# Patient Record
Sex: FEMALE | Race: WHITE | NOT HISPANIC OR LATINO | Employment: OTHER | URBAN - METROPOLITAN AREA
[De-identification: names, ages, dates, MRNs, and addresses within clinical notes are randomized per-mention and may not be internally consistent; named-entity substitution may affect disease eponyms.]

---

## 2017-06-07 ENCOUNTER — OFFICE VISIT (OUTPATIENT)
Dept: OTOLARYNGOLOGY | Facility: CLINIC | Age: 67
End: 2017-06-07
Payer: COMMERCIAL

## 2017-06-07 VITALS
HEART RATE: 71 BPM | TEMPERATURE: 98.2 F | SYSTOLIC BLOOD PRESSURE: 134 MMHG | BODY MASS INDEX: 28.08 KG/M2 | DIASTOLIC BLOOD PRESSURE: 81 MMHG | WEIGHT: 182 LBS

## 2017-06-07 DIAGNOSIS — H61.23 BILATERAL IMPACTED CERUMEN: Primary | ICD-10-CM

## 2017-06-07 PROCEDURE — 69210 REMOVE IMPACTED EAR WAX UNI: CPT | Performed by: OTOLARYNGOLOGY

## 2017-06-07 PROCEDURE — 99207 ZZC NO CHARGE LOS: CPT | Performed by: OTOLARYNGOLOGY

## 2017-06-07 ASSESSMENT — PAIN SCALES - GENERAL: PAINLEVEL: NO PAIN (0)

## 2017-06-07 NOTE — NURSING NOTE
"Initial /81  Pulse 71  Temp 98.2  F (36.8  C) (Oral)  Wt 82.6 kg (182 lb)  BMI 28.08 kg/m2 Estimated body mass index is 28.08 kg/(m^2) as calculated from the following:    Height as of 2/4/15: 1.715 m (5' 7.5\").    Weight as of this encounter: 82.6 kg (182 lb). .    Isela Rodas LPN    "

## 2017-06-07 NOTE — MR AVS SNAPSHOT
"              After Visit Summary   2017    Reba Arevalo    MRN: [id number]           Patient Information     Date Of Birth          1950        Visit Information        Provider Department      2017 12:00 PM Nicole Capone MD [facility]        Today's Diagnoses     Bilateral impacted cerumen    -  1      Care Instructions    Per physician's instructions            Follow-ups after your visit        Who to contact     If you have questions or need follow up information about today's clinic visit or your schedule please contact [facility] directly at [phone].  Normal or non-critical lab and imaging results will be communicated to you by SA Ignitehart, letter or phone within 4 business days after the clinic has received the results. If you do not hear from us within 7 days, please contact the clinic through SA Ignitehart or phone. If you have a critical or abnormal lab result, we will notify you by phone as soon as possible.  Submit refill requests through [employer] or call your pharmacy and they will forward the refill request to us. Please allow 3 business days for your refill to be completed.          Additional Information About Your Visit        MyChart Information     Echovox lets you send messages to your doctor, view your test results, renew your prescriptions, schedule appointments and more. To sign up, go to [email or web address] . Click on \"Log in\" on the left side of the screen, which will take you to the Welcome page. Then click on \"Sign up Now\" on the right side of the page.     You will be asked to enter the access code listed below, as well as some personal information. Please follow the directions to create your username and password.     Your access code is: [email or web address]  Expires: 2017 12:35 PM     Your access code will  in 90 days. If you need help or a new code, please call your [facility] or [phone].        Care EveryWhere ID     This is your " Care EveryWhere ID. This could be used by other organizations to access your [facility] medical records  [id number]        Your Vitals Were     Pulse Temperature BMI (Body Mass Index)             71 98.2  F (36.8  C) (Oral) 28.08 kg/m2          Blood Pressure from Last 3 Encounters:   06/07/17 134/81   12/14/16 138/88   03/23/16 155/83    Weight from Last 3 Encounters:   06/07/17 82.6 kg (182 lb)   12/14/16 95.7 kg (211 lb)   03/23/16 90.7 kg (200 lb)              We Performed the Following     Remove Christina        Primary Care Provider    [facility] Other Clinic                Thank you!     Thank you for choosing [facility]  for your care. Our goal is always to provide you with excellent care. Hearing back from our patients is one way we can continue to improve our services. Please take a few minutes to complete the written survey that you may receive in the mail after your visit with us. Thank you!             Your Updated Medication List - Protect others around you: Learn how to safely use, store and throw away your medicines at www.disposemymeds.org.          This list is accurate as of: 6/7/17 12:35 PM.  Always use your most recent med list.                   Brand Name Dispense Instructions for use    ALEVE 220 MG capsule   Generic drug:  naproxen sodium      Take 220 mg by mouth 2 times daily (with meals).       CALCIUM + D PO      Take  by mouth 2 times daily.       CELEBREX PO      Take 50 mg by mouth daily       DAILY MULTIVITAMIN PO      Take  by mouth daily.       hydrochlorothiazide 25 MG tablet    HYDRODIURIL     Take 25 mg by mouth daily.       latanoprost 0.005 % ophthalmic solution    XALATAN     1 drop At Bedtime

## 2017-06-07 NOTE — PROGRESS NOTES
History of Present Illness - Reba Arevalo is a 66 year old female routinely seen for cerumen impactions, last seen in December 2016. She denies any new otorrhea, otalgia, or vertigo. She denies q-tip use. She usually comes in every 6 months for an ear cleaning. 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     Recently she noticed the left ear was popping and cracking when she was chewing. This improved with application of some peroxide.    Past Medical History -   No prior ear surgery.    Current Medications -   Current Outpatient Prescriptions:      Celecoxib (CELEBREX PO), Take 50 mg by mouth daily, Disp: , Rfl:      latanoprost (XALATAN) 0.005 % ophthalmic solution, 1 drop At Bedtime, Disp: , Rfl:      hydrochlorothiazide (HYDRODIURIL) 25 MG tablet, Take 25 mg by mouth daily., Disp: , Rfl:      Multiple Vitamin (DAILY MULTIVITAMIN PO), Take  by mouth daily., Disp: , Rfl:      Calcium-Vitamin D (CALCIUM + D PO), Take  by mouth 2 times daily., Disp: , Rfl:      naproxen sodium (ALEVE) 220 MG capsule, Take 220 mg by mouth 2 times daily (with meals)., Disp: , Rfl:     Allergies -   Allergies   Allergen Reactions     Penicillins        Social History -   History     Social History     Marital Status:      Spouse Name: N/A     Number of Children: N/A     Years of Education: N/A     Social History Main Topics     Smoking status: Never Smoker      Smokeless tobacco: Never Used     Alcohol Use: No     Drug Use: No     Sexual Activity: Not on file     Other Topics Concern     Not on file     Social History Narrative       Family History -   Family History   Problem Relation Age of Onset     HEART DISEASE Mother      DIABETES Father      HEART DISEASE Father        Review of Systems - As per HPI and PMHx, otherwise 7 system review of the head and neck negative.    Exam:  /81  Pulse 71  Temp 98.2  F (36.8  C) (Oral)  Wt 82.6 kg (182 lb)  BMI 28.08 kg/m2  General - The patient is well nourished and well  developed, and appears to have good nutritional status.  Alert and oriented to person and place, answers questions and cooperates with examination appropriately.   Head and Face - Normocephalic and atraumatic, with no gross asymmetry noted of the contour of the facial features.  The facial nerve is intact, with strong symmetric movements.  Eyes - Extraocular movements intact.  Sclera were not icteric or injected, conjunctiva were pink and moist.    Procedure: Cerumen Disimpaction  Diagnosis: Cerumen Impaction    Procedure and Findings  Ears - On examination of the ears, I found that the  ears were impacted with dense cerumen obscuring visualization of the right and left TM.  Therefore, I positioned the patient and I used the binocular surgical microscope to assist in visualization of the affected ear(s).  I began by using a cerumen loop to gently lift the edges of the cerumen mass away from the walls of the external canal.  Once I did this, I was able to suction away fragments of wax and debris using suction.  Once the mass was loose enough, the entire plug was pulled from the canal with microforceps.  The tympanic membrane was intact without sign of perforation or middle ear effusion and minimal ear canal trauma.         A/P - Reba Arevalo is a 66 year old female with bilateral cerumen impactions, worse on the left. These have been cleared today, and the eardrums seem to be intact and healthy. I recommended she schedule another appointment in 5-6 months, and encouraged her to avoid placing peroxide in the ears.      Dr. Nicole Capone MD  Otolaryngology  [facility]

## 2017-11-13 ENCOUNTER — OFFICE VISIT (OUTPATIENT)
Dept: OTOLARYNGOLOGY | Facility: CLINIC | Age: 67
End: 2017-11-13
Payer: COMMERCIAL

## 2017-11-13 VITALS
TEMPERATURE: 97.5 F | HEIGHT: 68 IN | BODY MASS INDEX: 26.22 KG/M2 | SYSTOLIC BLOOD PRESSURE: 129 MMHG | HEART RATE: 65 BPM | WEIGHT: 173 LBS | DIASTOLIC BLOOD PRESSURE: 77 MMHG

## 2017-11-13 DIAGNOSIS — H61.23 BILATERAL IMPACTED CERUMEN: Primary | ICD-10-CM

## 2017-11-13 PROCEDURE — 69210 REMOVE IMPACTED EAR WAX UNI: CPT | Mod: 50 | Performed by: OTOLARYNGOLOGY

## 2017-11-13 PROCEDURE — 99212 OFFICE O/P EST SF 10 MIN: CPT | Mod: 25 | Performed by: OTOLARYNGOLOGY

## 2017-11-13 ASSESSMENT — PAIN SCALES - GENERAL: PAINLEVEL: NO PAIN (0)

## 2017-11-13 NOTE — MR AVS SNAPSHOT
"              After Visit Summary   2017    Reba Arevalo    MRN: [id number]           Patient Information     Date Of Birth          1950        Visit Information        Provider Department      2017 12:15 PM Nicole Capone MD [facility]        Today's Diagnoses     Bilateral impacted cerumen    -  1      Care Instructions    Per Physician's instructions            Follow-ups after your visit        Who to contact     If you have questions or need follow up information about today's clinic visit or your schedule please contact [facility] directly at [phone].  Normal or non-critical lab and imaging results will be communicated to you by [email or web address], letter or phone within 4 business days after the clinic has received the results. If you do not hear from us within 7 days, please contact the clinic through [email or web address] or phone. If you have a critical or abnormal lab result, we will notify you by phone as soon as possible.  Submit refill requests through [employer] or call your pharmacy and they will forward the refill request to us. Please allow 3 business days for your refill to be completed.          Additional Information About Your Visit        MyChart Information     [employer] lets you send messages to your doctor, view your test results, renew your prescriptions, schedule appointments and more. To sign up, go to [email or web address] . Click on \"Log in\" on the left side of the screen, which will take you to the Welcome page. Then click on \"Sign up Now\" on the right side of the page.     You will be asked to enter the access code listed below, as well as some personal information. Please follow the directions to create your username and password.     Your access code is: [email or web address]  Expires: 2018  2:41 PM     Your access code will  in 90 days. If you need help or a new code, please call your [facility] or [phone].        Care EveryWhere ID     This is " "your Care EveryWhere ID. This could be used by other organizations to access your [facility] medical records  [id number]        Your Vitals Were     Pulse Temperature Height BMI (Body Mass Index)          65 97.5  F (36.4  C) (Oral) 1.721 m (5' 7.75\") 26.5 kg/m2         Blood Pressure from Last 3 Encounters:   11/13/17 129/77   06/07/17 134/81   12/14/16 138/88    Weight from Last 3 Encounters:   11/13/17 78.5 kg (173 lb)   06/07/17 82.6 kg (182 lb)   12/14/16 95.7 kg (211 lb)              We Performed the Following     Remove [facility]        Primary Care Provider Office Phone # Fax #    Eri HENSON Owusu Gela [phone]       [facility] [address] [address]        Equal Access to Services     [facility]: Hadii libra hernandez hadasho Soomaali, waaxda luqadaha, qaybta kaalmada adeegyada, karl gregory . So [facility] [phone].    ATENCIÓN: Si habla español, tiene a barajas disposición servicios gratuitos de asistencia lingüística. Martínez al [phone].    We comply with applicable federal civil rights laws and [place] laws. We do not discriminate on the basis of race, color, national origin, age, disability, sex, sexual orientation, or gender identity.            Thank you!     Thank you for choosing [facility]  for your care. Our goal is always to provide you with excellent care. Hearing back from our patients is one way we can continue to improve our services. Please take a few minutes to complete the written survey that you may receive in the mail after your visit with us. Thank you!             Your Updated Medication List - Protect others around you: Learn how to safely use, store and throw away your medicines at www.disposemymeds.org.          This list is accurate as of: 11/13/17  2:41 PM.  Always use your most recent med list.                   Brand Name Dispense Instructions for use Diagnosis    ALEVE 220 MG capsule   Generic drug:  " naproxen sodium      Take 220 mg by mouth 2 times daily (with meals).        CALCIUM + D PO      Take  by mouth 2 times daily.        CELEBREX PO      Take 50 mg by mouth daily        DAILY MULTIVITAMIN PO      Take  by mouth daily.        hydrochlorothiazide 25 MG tablet    HYDRODIURIL     Take 25 mg by mouth daily.        latanoprost 0.005 % ophthalmic solution    XALATAN     1 drop At Bedtime

## 2017-11-13 NOTE — NURSING NOTE
"Initial /77 (BP Location: Right arm, Patient Position: Sitting, Cuff Size: Adult Large)  Pulse 65  Temp 97.5  F (36.4  C) (Oral)  Ht 1.721 m (5' 7.75\")  Wt 78.5 kg (173 lb)  BMI 26.5 kg/m2 Estimated body mass index is 26.5 kg/(m^2) as calculated from the following:    Height as of this encounter: 1.721 m (5' 7.75\").    Weight as of this encounter: 78.5 kg (173 lb). .    Denise Aguirre CMA    "

## 2017-11-13 NOTE — PROGRESS NOTES
"History of Present Illness - Reba Arevalo is a 67 year old female routinely seen for cerumen impactions, last seen in December 2016. She denies any new otorrhea, otalgia, or vertigo. She denies q-tip use. Patient has never worn hearing aids. She usually comes in every 6 months for an ear cleaning. 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         Past Medical History -   No prior ear surgery.    Current Medications -   Current Outpatient Prescriptions:      Celecoxib (CELEBREX PO), Take 50 mg by mouth daily, Disp: , Rfl:      latanoprost (XALATAN) 0.005 % ophthalmic solution, 1 drop At Bedtime, Disp: , Rfl:      hydrochlorothiazide (HYDRODIURIL) 25 MG tablet, Take 25 mg by mouth daily., Disp: , Rfl:      naproxen sodium (ALEVE) 220 MG capsule, Take 220 mg by mouth 2 times daily (with meals)., Disp: , Rfl:      Multiple Vitamin (DAILY MULTIVITAMIN PO), Take  by mouth daily., Disp: , Rfl:      Calcium-Vitamin D (CALCIUM + D PO), Take  by mouth 2 times daily., Disp: , Rfl:     Allergies -   Allergies   Allergen Reactions     Penicillins        Social History -   History     Social History     Marital Status:      Spouse Name: N/A     Number of Children: N/A     Years of Education: N/A     Social History Main Topics     Smoking status: Never Smoker      Smokeless tobacco: Never Used     Alcohol Use: No     Drug Use: No     Sexual Activity: Not on file     Other Topics Concern     Not on file     Social History Narrative       Family History -   Family History   Problem Relation Age of Onset     HEART DISEASE Mother      DIABETES Father      HEART DISEASE Father        Review of Systems - As per HPI and PMHx, otherwise 7 system review of the head and neck negative.    Exam:  /77 (BP Location: Right arm, Patient Position: Sitting, Cuff Size: Adult Large)  Pulse 65  Temp 97.5  F (36.4  C) (Oral)  Ht 1.721 m (5' 7.75\")  Wt 78.5 kg (173 lb)  BMI 26.5 kg/m2  General - The patient is well nourished and well " developed, and appears to have good nutritional status.  Alert and oriented to person and place, answers questions and cooperates with examination appropriately.   Head and Face - Normocephalic and atraumatic, with no gross asymmetry noted of the contour of the facial features.  The facial nerve is intact, with strong symmetric movements.  Eyes - Extraocular movements intact.  Sclera were not icteric or injected, conjunctiva were pink and moist.  Ears - Bilateral pinna and EACs with normal appearing overlying skin. Tympanic membrane intact with good mobility on pneumatic otoscopy bilaterally. Bony landmarks of the ossicular chain are normal. The tympanic membranes are normal in appearance. No retraction, perforation, or masses.  No fluid or purulence was seen in the external canal or the middle ear. Bilateral cerumen impaction.        Procedure: Cerumen Disimpaction  Diagnosis: Cerumen Impaction    Procedure and Findings  Ears - On examination of the ears, I found that the  ears were impacted with dense cerumen obscuring visualization of the right and left TM.  Therefore, I positioned the patient and I used the binocular surgical microscope to assist in visualization of the affected ear(s).  I began by using a cerumen loop to gently lift the edges of the cerumen mass away from the walls of the external canal.  Once I did this, I was able to suction away fragments of wax and debris using suction.  Once the mass was loose enough, the entire plug was pulled from the canal with microforceps.  The tympanic membrane was intact without sign of perforation or middle ear effusion and minimal ear canal trauma.         A/P - Reba Arevalo is a 67 year old female with bilateral cerumen impactions, worse on the left. These have been cleared today, and the eardrums seem to be intact and healthy. She has very collapsible external ear canals. I recommended she schedule another appointment in 5-6 months.      This document serves as  a record of the services and decisions personally performed and made by Dr. Nicole Capone MD. It was created on his behalf by Génesis Velazquez, a trained medical scribe. The creation of this document is based the provider's statements to the medical scribe.  Génesis Velazquez 12:01 PM 11/13/2017    Provider:   The information in this document, created by the medical scribe for me, accurately reflects the services I personally performed and the decisions made by me. I have reviewed and approved this document for accuracy prior to leaving the patient care area.  Dr. Nicole Capone MD 12:01 PM 11/13/2017    Dr. Nicole Capone MD  Otolaryngology  [facility]

## 2017-11-13 NOTE — LETTER
11/13/2017         RE: Reba Arevalo  [address] [address]        Dear Colleague,    Thank you for referring your patient, Reba Arevalo, to the [facility]. Please see a copy of my visit note below.    History of Present Illness - Reba Arevalo is a 67 year old female routinely seen for cerumen impactions, last seen in December 2016. She denies any new otorrhea, otalgia, or vertigo. She denies q-tip use. Patient has never worn hearing aids. She usually comes in every 6 months for an ear cleaning. 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         Past Medical History -   No prior ear surgery.    Current Medications -   Current Outpatient Prescriptions:      Celecoxib (CELEBREX PO), Take 50 mg by mouth daily, Disp: , Rfl:      latanoprost (XALATAN) 0.005 % ophthalmic solution, 1 drop At Bedtime, Disp: , Rfl:      hydrochlorothiazide (HYDRODIURIL) 25 MG tablet, Take 25 mg by mouth daily., Disp: , Rfl:      naproxen sodium (ALEVE) 220 MG capsule, Take 220 mg by mouth 2 times daily (with meals)., Disp: , Rfl:      Multiple Vitamin (DAILY MULTIVITAMIN PO), Take  by mouth daily., Disp: , Rfl:      Calcium-Vitamin D (CALCIUM + D PO), Take  by mouth 2 times daily., Disp: , Rfl:     Allergies -   Allergies   Allergen Reactions     Penicillins        Social History -   History     Social History     Marital Status:      Spouse Name: N/A     Number of Children: N/A     Years of Education: N/A     Social History Main Topics     Smoking status: Never Smoker      Smokeless tobacco: Never Used     Alcohol Use: No     Drug Use: No     Sexual Activity: Not on file     Other Topics Concern     Not on file     Social History Narrative       Family History -   Family History   Problem Relation Age of Onset     HEART DISEASE Mother      DIABETES Father      HEART DISEASE Father        Review of Systems - As per HPI and PMHx, otherwise 7 system review of the head and neck negative.    Exam:  /77  "(BP Location: Right arm, Patient Position: Sitting, Cuff Size: Adult Large)  Pulse 65  Temp 97.5  F (36.4  C) (Oral)  Ht 1.721 m (5' 7.75\")  Wt 78.5 kg (173 lb)  BMI 26.5 kg/m2  General - The patient is well nourished and well developed, and appears to have good nutritional status.  Alert and oriented to person and place, answers questions and cooperates with examination appropriately.   Head and Face - Normocephalic and atraumatic, with no gross asymmetry noted of the contour of the facial features.  The facial nerve is intact, with strong symmetric movements.  Eyes - Extraocular movements intact.  Sclera were not icteric or injected, conjunctiva were pink and moist.  Ears - Bilateral pinna and EACs with normal appearing overlying skin. Tympanic membrane intact with good mobility on pneumatic otoscopy bilaterally. Bony landmarks of the ossicular chain are normal. The tympanic membranes are normal in appearance. No retraction, perforation, or masses.  No fluid or purulence was seen in the external canal or the middle ear. Bilateral cerumen impaction.        Procedure: Cerumen Disimpaction  Diagnosis: Cerumen Impaction    Procedure and Findings  Ears - On examination of the ears, I found that the  ears were impacted with dense cerumen obscuring visualization of the right and left TM.  Therefore, I positioned the patient and I used the binocular surgical microscope to assist in visualization of the affected ear(s).  I began by using a cerumen loop to gently lift the edges of the cerumen mass away from the walls of the external canal.  Once I did this, I was able to suction away fragments of wax and debris using suction.  Once the mass was loose enough, the entire plug was pulled from the canal with microforceps.  The tympanic membrane was intact without sign of perforation or middle ear effusion and minimal ear canal trauma.         A/P - Reba Arevalo is a 67 year old female with bilateral cerumen impactions, " worse on the left. These have been cleared today, and the eardrums seem to be intact and healthy. She has very collapsible external ear canals. I recommended she schedule another appointment in 5-6 months.      This document serves as a record of the services and decisions personally performed and made by Dr. Nicole Capone MD. It was created on his behalf by Génesis Velazquez, a trained medical scribe. The creation of this document is based the provider's statements to the medical scribe.  Génesis Velazquez 12:01 PM 11/13/2017    Provider:   The information in this document, created by the medical scribe for me, accurately reflects the services I personally performed and the decisions made by me. I have reviewed and approved this document for accuracy prior to leaving the patient care area.  Dr. Nicole Capone MD 12:01 PM 11/13/2017    Dr. Nicole Capone MD  Otolaryngology  [facility]      Again, thank you for allowing me to participate in the care of your patient.        Sincerely,        Nicole Capone MD

## 2018-04-15 NOTE — PROGRESS NOTES
History of Present Illness - Reba Arevalo is a 67 year old female routinely seen for cerumen impactions, last seen in November 2017. She denies any new otorrhea, otalgia, or vertigo. She denies q-tip use. Patient has never worn hearing aids. She usually comes in every 6 months for an ear cleaning. 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         Past Medical History -   No prior ear surgery.    Current Medications -   Current Outpatient Prescriptions:      Celecoxib (CELEBREX PO), Take 50 mg by mouth daily, Disp: , Rfl:      latanoprost (XALATAN) 0.005 % ophthalmic solution, 1 drop At Bedtime, Disp: , Rfl:      hydrochlorothiazide (HYDRODIURIL) 25 MG tablet, Take 25 mg by mouth daily., Disp: , Rfl:      Multiple Vitamin (DAILY MULTIVITAMIN PO), Take  by mouth daily., Disp: , Rfl:      Calcium-Vitamin D (CALCIUM + D PO), Take  by mouth 2 times daily., Disp: , Rfl:      naproxen sodium (ALEVE) 220 MG capsule, Take 220 mg by mouth 2 times daily (with meals)., Disp: , Rfl:     Allergies -   Allergies   Allergen Reactions     Penicillins        Social History -   History     Social History     Marital Status:      Spouse Name: N/A     Number of Children: N/A     Years of Education: N/A     Social History Main Topics     Smoking status: Never Smoker      Smokeless tobacco: Never Used     Alcohol Use: No     Drug Use: No     Sexual Activity: Not on file     Other Topics Concern     Not on file     Social History Narrative       Family History -   Family History   Problem Relation Age of Onset     HEART DISEASE Mother      DIABETES Father      HEART DISEASE Father      Arthritis Brother      RA       Review of Systems - As per HPI and PMHx, otherwise 7 system review of the head and neck negative.    Exam:  /83 (BP Location: Left arm, Patient Position: Chair, Cuff Size: Adult Large)  Pulse 91  Temp 98  F (36.7  C) (Oral)  Wt 79.4 kg (175 lb)  BMI 26.81 kg/m2  General - The patient is well nourished and well  developed, and appears to have good nutritional status.  Alert and oriented to person and place, answers questions and cooperates with examination appropriately.   Head and Face - Normocephalic and atraumatic, with no gross asymmetry noted of the contour of the facial features.  The facial nerve is intact, with strong symmetric movements.  Eyes - Extraocular movements intact.  Sclera were not icteric or injected, conjunctiva were pink and moist.  Ears - Bilateral pinna and EACs with normal appearing overlying skin. Tympanic membrane intact with good mobility on pneumatic otoscopy bilaterally. Bony landmarks of the ossicular chain are normal. The tympanic membranes are normal in appearance. No retraction, perforation, or masses.  No fluid or purulence was seen in the external canal or the middle ear. Bilateral cerumen impaction.        Procedure: Cerumen Disimpaction  Diagnosis: Cerumen Impaction    Procedure and Findings  Ears - On examination of the ears, I found that the  ears were impacted with dense cerumen obscuring visualization of the right and left TM.  Therefore, I positioned the patient and I used the binocular surgical microscope to assist in visualization of the affected ear(s).  I began by using a cerumen loop to gently lift the edges of the cerumen mass away from the walls of the external canal.  Once I did this, I was able to suction away fragments of wax and debris using suction.  Once the mass was loose enough, the entire plug was pulled from the canal with microforceps.  The tympanic membrane was intact without sign of perforation or middle ear effusion and minimal ear canal trauma.         A/P - Reba Arevalo is a 67 year old female with bilateral cerumen impactions, worse on the left. These have been cleared today, and the eardrums seem to be intact and healthy. She has very collapsible external ear canals. I recommended she schedule another appointment in 5-6 months.        Dr. Nicole Capone,  MD  Otolaryngology  [facility]

## 2018-04-16 ENCOUNTER — OFFICE VISIT (OUTPATIENT)
Dept: OTOLARYNGOLOGY | Facility: CLINIC | Age: 68
End: 2018-04-16
Payer: COMMERCIAL

## 2018-04-16 VITALS
DIASTOLIC BLOOD PRESSURE: 83 MMHG | HEART RATE: 91 BPM | SYSTOLIC BLOOD PRESSURE: 136 MMHG | WEIGHT: 175 LBS | BODY MASS INDEX: 26.81 KG/M2 | TEMPERATURE: 98 F

## 2018-04-16 DIAGNOSIS — H61.23 BILATERAL IMPACTED CERUMEN: Primary | ICD-10-CM

## 2018-04-16 PROCEDURE — 99207 ZZC DROP WITH A PROCEDURE: CPT | Performed by: OTOLARYNGOLOGY

## 2018-04-16 PROCEDURE — 69210 REMOVE IMPACTED EAR WAX UNI: CPT | Performed by: OTOLARYNGOLOGY

## 2018-04-16 ASSESSMENT — PAIN SCALES - GENERAL: PAINLEVEL: NO PAIN (0)

## 2018-04-16 NOTE — NURSING NOTE
"Initial /83 (BP Location: Left arm, Patient Position: Chair, Cuff Size: Adult Large)  Pulse 91  Temp 98  F (36.7  C) (Oral)  Wt 79.4 kg (175 lb)  BMI 26.81 kg/m2 Estimated body mass index is 26.81 kg/(m^2) as calculated from the following:    Height as of 11/13/17: 1.721 m (5' 7.75\").    Weight as of this encounter: 79.4 kg (175 lb). .    Isela Rodas LPN    "

## 2018-04-16 NOTE — LETTER
4/16/2018         RE: Reba Arevalo  [address] [address]        Dear Colleague,    Thank you for referring your patient, Reba Arevalo, to the [facility]. Please see a copy of my visit note below.    History of Present Illness - Reba Arevalo is a 67 year old female routinely seen for cerumen impactions, last seen in November 2017. She denies any new otorrhea, otalgia, or vertigo. She denies q-tip use. Patient has never worn hearing aids. She usually comes in every 6 months for an ear cleaning. 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         Past Medical History -   No prior ear surgery.    Current Medications -   Current Outpatient Prescriptions:      Celecoxib (CELEBREX PO), Take 50 mg by mouth daily, Disp: , Rfl:      latanoprost (XALATAN) 0.005 % ophthalmic solution, 1 drop At Bedtime, Disp: , Rfl:      hydrochlorothiazide (HYDRODIURIL) 25 MG tablet, Take 25 mg by mouth daily., Disp: , Rfl:      Multiple Vitamin (DAILY MULTIVITAMIN PO), Take  by mouth daily., Disp: , Rfl:      Calcium-Vitamin D (CALCIUM + D PO), Take  by mouth 2 times daily., Disp: , Rfl:      naproxen sodium (ALEVE) 220 MG capsule, Take 220 mg by mouth 2 times daily (with meals)., Disp: , Rfl:     Allergies -   Allergies   Allergen Reactions     Penicillins        Social History -   History     Social History     Marital Status:      Spouse Name: N/A     Number of Children: N/A     Years of Education: N/A     Social History Main Topics     Smoking status: Never Smoker      Smokeless tobacco: Never Used     Alcohol Use: No     Drug Use: No     Sexual Activity: Not on file     Other Topics Concern     Not on file     Social History Narrative       Family History -   Family History   Problem Relation Age of Onset     HEART DISEASE Mother      DIABETES Father      HEART DISEASE Father      Arthritis Brother      RA       Review of Systems - As per HPI and PMHx, otherwise 7 system review of the head and neck  negative.    Exam:  /83 (BP Location: Left arm, Patient Position: Chair, Cuff Size: Adult Large)  Pulse 91  Temp 98  F (36.7  C) (Oral)  Wt 79.4 kg (175 lb)  BMI 26.81 kg/m2  General - The patient is well nourished and well developed, and appears to have good nutritional status.  Alert and oriented to person and place, answers questions and cooperates with examination appropriately.   Head and Face - Normocephalic and atraumatic, with no gross asymmetry noted of the contour of the facial features.  The facial nerve is intact, with strong symmetric movements.  Eyes - Extraocular movements intact.  Sclera were not icteric or injected, conjunctiva were pink and moist.  Ears - Bilateral pinna and EACs with normal appearing overlying skin. Tympanic membrane intact with good mobility on pneumatic otoscopy bilaterally. Bony landmarks of the ossicular chain are normal. The tympanic membranes are normal in appearance. No retraction, perforation, or masses.  No fluid or purulence was seen in the external canal or the middle ear. Bilateral cerumen impaction.        Procedure: Cerumen Disimpaction  Diagnosis: Cerumen Impaction    Procedure and Findings  Ears - On examination of the ears, I found that the  ears were impacted with dense cerumen obscuring visualization of the right and left TM.  Therefore, I positioned the patient and I used the binocular surgical microscope to assist in visualization of the affected ear(s).  I began by using a cerumen loop to gently lift the edges of the cerumen mass away from the walls of the external canal.  Once I did this, I was able to suction away fragments of wax and debris using suction.  Once the mass was loose enough, the entire plug was pulled from the canal with microforceps.  The tympanic membrane was intact without sign of perforation or middle ear effusion and minimal ear canal trauma.         A/P - Reba Arevalo is a 67 year old female with bilateral cerumen impactions,  worse on the left. These have been cleared today, and the eardrums seem to be intact and healthy. She has very collapsible external ear canals. I recommended she schedule another appointment in 5-6 months.        Dr. Nicole Capone MD  Otolaryngology  [facility]      Again, thank you for allowing me to participate in the care of your patient.        Sincerely,        Nicole Capone MD

## 2018-04-16 NOTE — MR AVS SNAPSHOT
"              After Visit Summary   2018    Reba Arevalo    MRN: [id number]           Patient Information     Date Of Birth          1950        Visit Information        Provider Department      2018 12:45 PM Nicole Capone MD [facility]        Today's Diagnoses     Bilateral impacted cerumen    -  1      Care Instructions    Per physician's instructions            Follow-ups after your visit        Who to contact     If you have questions or need follow up information about today's clinic visit or your schedule please contact [facility] directly at [phone].  Normal or non-critical lab and imaging results will be communicated to you by [employer], letter or phone within 4 business days after the clinic has received the results. If you do not hear from us within 7 days, please contact the clinic through [employer] or phone. If you have a critical or abnormal lab result, we will notify you by phone as soon as possible.  Submit refill requests through [employer] or call your pharmacy and they will forward the refill request to us. Please allow 3 business days for your refill to be completed.          Additional Information About Your Visit        MyChart Information     [employer] lets you send messages to your doctor, view your test results, renew your prescriptions, schedule appointments and more. To sign up, go to [email or web address] . Click on \"Log in\" on the left side of the screen, which will take you to the Welcome page. Then click on \"Sign up Now\" on the right side of the page.     You will be asked to enter the access code listed below, as well as some personal information. Please follow the directions to create your username and password.     Your access code is: [email or web address]  Expires: 2018  8:51 AM     Your access code will  in 90 days. If you need help or a new code, please call your [facility] or [phone].        Care EveryWhere ID     This is " your Care EveryWhere ID. This could be used by other organizations to access your [facility] medical records  [id number]        Your Vitals Were     Pulse Temperature BMI (Body Mass Index)             91 98  F (36.7  C) (Oral) 26.81 kg/m2          Blood Pressure from Last 3 Encounters:   04/16/18 136/83   11/13/17 129/77   06/07/17 134/81    Weight from Last 3 Encounters:   04/16/18 79.4 kg (175 lb)   11/13/17 78.5 kg (173 lb)   06/07/17 82.6 kg (182 lb)              We Performed the Following     Remove [facility]        Primary Care Provider Office Phone # Fax #    [phone]       [facility] [address] [address]        Equal Access to Services     [facility] : Hadii libra bourneo Soomaali, waaxda luqadaha, qaybta kaalmada ademartyyaemigdio, karl gregory . So [facility] [phone].    ATENCIÓN: Si habla español, tiene a barajas disposición servicios gratuitos de asistencia lingüística. Martínez al [phone].    We comply with applicable federal civil rights laws and [place] laws. We do not discriminate on the basis of race, color, national origin, age, disability, sex, sexual orientation, or gender identity.            Thank you!     Thank you for choosing [facility]  for your care. Our goal is always to provide you with excellent care. Hearing back from our patients is one way we can continue to improve our services. Please take a few minutes to complete the written survey that you may receive in the mail after your visit with us. Thank you!             Your Updated Medication List - Protect others around you: Learn how to safely use, store and throw away your medicines at www.disposemymeds.org.          This list is accurate as of 4/16/18 11:59 PM.  Always use your most recent med list.                   Brand Name Dispense Instructions for use Diagnosis    ALEVE 220 MG capsule   Generic drug:  naproxen sodium      Take 220  mg by mouth 2 times daily (with meals).        CALCIUM + D PO      Take  by mouth 2 times daily.        CELEBREX PO      Take 50 mg by mouth daily        DAILY MULTIVITAMIN PO      Take  by mouth daily.        hydrochlorothiazide 25 MG tablet    HYDRODIURIL     Take 25 mg by mouth daily.        latanoprost 0.005 % ophthalmic solution    XALATAN     1 drop At Bedtime

## 2019-02-19 ENCOUNTER — OFFICE VISIT (OUTPATIENT)
Dept: OTOLARYNGOLOGY | Facility: CLINIC | Age: 69
End: 2019-02-19
Payer: COMMERCIAL

## 2019-02-19 VITALS
HEART RATE: 80 BPM | BODY MASS INDEX: 27.88 KG/M2 | SYSTOLIC BLOOD PRESSURE: 117 MMHG | TEMPERATURE: 98 F | WEIGHT: 182 LBS | RESPIRATION RATE: 16 BRPM | DIASTOLIC BLOOD PRESSURE: 71 MMHG

## 2019-02-19 DIAGNOSIS — H61.23 BILATERAL IMPACTED CERUMEN: Primary | ICD-10-CM

## 2019-02-19 PROCEDURE — 99207 ZZC NO CHARGE LOS: CPT | Performed by: OTOLARYNGOLOGY

## 2019-02-19 PROCEDURE — 69210 REMOVE IMPACTED EAR WAX UNI: CPT | Performed by: OTOLARYNGOLOGY

## 2019-02-19 RX ORDER — FLUOCINOLONE ACETONIDE 0.1 MG/ML
SOLUTION TOPICAL
COMMUNITY
Start: 2017-01-25

## 2019-02-19 RX ORDER — SENNOSIDES 8.6 MG
1300 CAPSULE ORAL
COMMUNITY

## 2019-02-19 RX ORDER — BENZOCAINE/MENTHOL 6 MG-10 MG
LOZENGE MUCOUS MEMBRANE
COMMUNITY

## 2019-02-19 NOTE — PROGRESS NOTES
History of Present Illness - Reba Arevalo is a 67 year old female routinely seen for cerumen impactions, last seen in April 2018. She noticed last week that her hearing suddenly seemed diminished on the left.  She denies any new otorrhea, otalgia, or vertigo. She denies q-tip use. Patient has never worn hearing aids. She usually comes in every 6 months or so for an ear cleaning. 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         Past Medical History -   No prior ear surgery.    Current Medications -   Current Outpatient Medications:      acetaminophen (TYLENOL) 650 MG CR tablet, Take 1,300 mg by mouth, Disp: , Rfl:      Calcium-Vitamin D (CALCIUM + D PO), Take  by mouth 2 times daily., Disp: , Rfl:      Cholecalciferol (VITAMIN D PO), , Disp: , Rfl:      fluocinolone (SYNALAR) 0.01 % solution, , Disp: , Rfl:      hydrochlorothiazide (HYDRODIURIL) 25 MG tablet, Take 25 mg by mouth daily., Disp: , Rfl:      hydrocortisone (CORTAID) 1 % external cream, , Disp: , Rfl:      latanoprost (XALATAN) 0.005 % ophthalmic solution, 1 drop At Bedtime, Disp: , Rfl:      Multiple Vitamin (DAILY MULTIVITAMIN PO), Take  by mouth daily., Disp: , Rfl:     Allergies -   Allergies   Allergen Reactions     Penicillins        Social History -   History     Social History     Marital Status:      Spouse Name: N/A     Number of Children: N/A     Years of Education: N/A     Social History Main Topics     Smoking status: Never Smoker      Smokeless tobacco: Never Used     Alcohol Use: No     Drug Use: No     Sexual Activity: Not on file     Other Topics Concern     Not on file     Social History Narrative       Family History -   Family History   Problem Relation Age of Onset     Heart Disease Mother      Diabetes Father      Heart Disease Father      Arthritis Brother         RA       Review of Systems - As per HPI and PMHx, otherwise 7 system review of the head and neck negative.    Exam:  /71 (BP Location: Left arm, Patient  Position: Chair, Cuff Size: Adult Regular)   Pulse 80   Temp 98  F (36.7  C) (Oral)   Resp 16   Wt 82.6 kg (182 lb)   BMI 27.88 kg/m    General - The patient is well nourished and well developed, and appears to have good nutritional status.  Alert and oriented to person and place, answers questions and cooperates with examination appropriately.   Head and Face - Normocephalic and atraumatic, with no gross asymmetry noted of the contour of the facial features.  The facial nerve is intact, with strong symmetric movements.  Eyes - Extraocular movements intact.  Sclera were not icteric or injected, conjunctiva were pink and moist.  Ears - Bilateral pinna and EACs with normal appearing overlying skin. Tympanic membrane intact with good mobility on pneumatic otoscopy bilaterally. Bony landmarks of the ossicular chain are normal. The tympanic membranes are normal in appearance. No retraction, perforation, or masses.  No fluid or purulence was seen in the external canal or the middle ear. Bilateral cerumen impaction.        Procedure: Cerumen Disimpaction  Diagnosis: Cerumen Impaction    Procedure and Findings  Ears - On examination of the ears, I found that the  ears were impacted with dense cerumen obscuring visualization of the right and left TM.  Therefore, I positioned the patient and I used the binocular surgical microscope to assist in visualization of the affected ear(s).  I began by using a cerumen loop to gently lift the edges of the cerumen mass away from the walls of the external canal.  Once I did this, I was able to suction away fragments of wax and debris using suction.  Once the mass was loose enough, the entire plug was pulled from the canal with microforceps.  The tympanic membrane was intact without sign of perforation or middle ear effusion and minimal ear canal trauma.         A/P - Reba Arevalo is a 68 year old female with bilateral cerumen impactions, worse on the left. These have been cleared  today, and the eardrums seem to be intact and healthy. She has very collapsible external ear canals. I advised her to have her ears evaluated promptly if she has sudden hearing loss, potentially by an audiologist, and I would be happy to clear cerumen afterwards should this be found to continue to be the issue.        Dr. Nicole Capone MD  Otolaryngology  [facility] [place]

## 2019-02-19 NOTE — LETTER
2/19/2019         RE: Reba Arevalo  [address] [address]        Dear Colleague,    Thank you for referring your patient, Reba Arevalo, to the [facility]. Please see a copy of my visit note below.    History of Present Illness - Reba Arevalo is a 67 year old female routinely seen for cerumen impactions, last seen in April 2018. She noticed last week that her hearing suddenly seemed diminished on the left.  She denies any new otorrhea, otalgia, or vertigo. She denies q-tip use. Patient has never worn hearing aids. She usually comes in every 6 months or so for an ear cleaning. 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         Past Medical History -   No prior ear surgery.    Current Medications -   Current Outpatient Medications:      acetaminophen (TYLENOL) 650 MG CR tablet, Take 1,300 mg by mouth, Disp: , Rfl:      Calcium-Vitamin D (CALCIUM + D PO), Take  by mouth 2 times daily., Disp: , Rfl:      Cholecalciferol (VITAMIN D PO), , Disp: , Rfl:      fluocinolone (SYNALAR) 0.01 % solution, , Disp: , Rfl:      hydrochlorothiazide (HYDRODIURIL) 25 MG tablet, Take 25 mg by mouth daily., Disp: , Rfl:      hydrocortisone (CORTAID) 1 % external cream, , Disp: , Rfl:      latanoprost (XALATAN) 0.005 % ophthalmic solution, 1 drop At Bedtime, Disp: , Rfl:      Multiple Vitamin (DAILY MULTIVITAMIN PO), Take  by mouth daily., Disp: , Rfl:     Allergies -   Allergies   Allergen Reactions     Penicillins        Social History -   History     Social History     Marital Status:      Spouse Name: N/A     Number of Children: N/A     Years of Education: N/A     Social History Main Topics     Smoking status: Never Smoker      Smokeless tobacco: Never Used     Alcohol Use: No     Drug Use: No     Sexual Activity: Not on file     Other Topics Concern     Not on file     Social History Narrative       Family History -   Family History   Problem Relation Age of Onset     Heart Disease Mother       Diabetes Father      Heart Disease Father      Arthritis Brother         RA       Review of Systems - As per HPI and PMHx, otherwise 7 system review of the head and neck negative.    Exam:  /71 (BP Location: Left arm, Patient Position: Chair, Cuff Size: Adult Regular)   Pulse 80   Temp 98  F (36.7  C) (Oral)   Resp 16   Wt 82.6 kg (182 lb)   BMI 27.88 kg/m     General - The patient is well nourished and well developed, and appears to have good nutritional status.  Alert and oriented to person and place, answers questions and cooperates with examination appropriately.   Head and Face - Normocephalic and atraumatic, with no gross asymmetry noted of the contour of the facial features.  The facial nerve is intact, with strong symmetric movements.  Eyes - Extraocular movements intact.  Sclera were not icteric or injected, conjunctiva were pink and moist.  Ears - Bilateral pinna and EACs with normal appearing overlying skin. Tympanic membrane intact with good mobility on pneumatic otoscopy bilaterally. Bony landmarks of the ossicular chain are normal. The tympanic membranes are normal in appearance. No retraction, perforation, or masses.  No fluid or purulence was seen in the external canal or the middle ear. Bilateral cerumen impaction.        Procedure: Cerumen Disimpaction  Diagnosis: Cerumen Impaction    Procedure and Findings  Ears - On examination of the ears, I found that the  ears were impacted with dense cerumen obscuring visualization of the right and left TM.  Therefore, I positioned the patient and I used the binocular surgical microscope to assist in visualization of the affected ear(s).  I began by using a cerumen loop to gently lift the edges of the cerumen mass away from the walls of the external canal.  Once I did this, I was able to suction away fragments of wax and debris using suction.  Once the mass was loose enough, the entire plug was pulled from the canal with microforceps.  The tympanic  membrane was intact without sign of perforation or middle ear effusion and minimal ear canal trauma.         A/P - Reba Arevalo is a 68 year old female with bilateral cerumen impactions, worse on the left. These have been cleared today, and the eardrums seem to be intact and healthy. She has very collapsible external ear canals. I advised her to have her ears evaluated promptly if she has sudden hearing loss, potentially by an audiologist, and I would be happy to clear cerumen afterwards should this be found to continue to be the issue.        Dr. Nicole Capone MD  Otolaryngology  [facility]      Again, thank you for allowing me to participate in the care of your patient.        Sincerely,        Nicole Capone MD

## 2019-02-19 NOTE — NURSING NOTE
"Chief Complaint   Patient presents with     Cerumen Impaction     Needs ears cleaned       Initial /71 (BP Location: Left arm, Patient Position: Chair, Cuff Size: Adult Regular)   Pulse 80   Temp 98  F (36.7  C) (Oral)   Resp 16   Wt 82.6 kg (182 lb)   BMI 27.88 kg/m   Estimated body mass index is 27.88 kg/m  as calculated from the following:    Height as of 11/13/17: 1.721 m (5' 7.75\").    Weight as of this encounter: 82.6 kg (182 lb).  Medications and allergies reviewed.    Gurvinder BLEVINS, CMA    "

## 2019-02-25 ENCOUNTER — COMMUNICATION - HEALTHEAST (OUTPATIENT)
Dept: PALLIATIVE MEDICINE | Facility: OTHER | Age: 69
End: 2019-02-25

## 2019-10-10 NOTE — PROGRESS NOTES
Chief Complaint   Patient presents with     Cerumen Impaction     needs ears cleaned     History of Present Illness   Reba Arevalo is a 69 year old female who presents to me today for ear evaluation.  Patient has a history of excessive cerumen.  She was last seen in February 2019 for ear cleaning.  She returns today for repeat examination and ear cleaning.  The patient denies significant hearing changes, otalgia, otorrhea, bloody otorrhea, dizziness, tinnitus, vertigo.  No history of ear surgery or chronic ear disease.     Past Medical History  There is no problem list on file for this patient.    Current Medications    Current Outpatient Medications:      Calcium-Vitamin D (CALCIUM + D PO), Take  by mouth 2 times daily., Disp: , Rfl:      Cholecalciferol (VITAMIN D PO), Take 1 tablet by mouth Taking 2000  IU, Disp: , Rfl:      hydrochlorothiazide (HYDRODIURIL) 25 MG tablet, Take 25 mg by mouth daily., Disp: , Rfl:      latanoprost (XALATAN) 0.005 % ophthalmic solution, 1 drop At Bedtime, Disp: , Rfl:      Multiple Vitamin (DAILY MULTIVITAMIN PO), Take  by mouth daily., Disp: , Rfl:      acetaminophen (TYLENOL) 650 MG CR tablet, Take 1,300 mg by mouth, Disp: , Rfl:      fluocinolone (SYNALAR) 0.01 % solution, , Disp: , Rfl:      hydrocortisone (CORTAID) 1 % external cream, , Disp: , Rfl:     Allergies  Allergies   Allergen Reactions     Penicillins        Social History  Social History     Socioeconomic History     Marital status:      Spouse name: Not on file     Number of children: Not on file     Years of education: Not on file     Highest education level: Not on file   Occupational History     Not on file   Social Needs     Financial resource strain: Not on file     Food insecurity:     Worry: Not on file     Inability: Not on file     Transportation needs:     Medical: Not on file     Non-medical: Not on file   Tobacco Use     Smoking status: Never Smoker     Smokeless tobacco: Never Used   Substance  and Sexual Activity     Alcohol use: No     Drug use: No     Sexual activity: Not on file   Lifestyle     Physical activity:     Days per week: Not on file     Minutes per session: Not on file     Stress: Not on file   Relationships     Social connections:     Talks on phone: Not on file     Gets together: Not on file     Attends Pentecostal service: Not on file     Active member of club or organization: Not on file     Attends meetings of clubs or organizations: Not on file     Relationship status: Not on file     Intimate partner violence:     Fear of current or ex partner: Not on file     Emotionally abused: Not on file     Physically abused: Not on file     Forced sexual activity: Not on file   Other Topics Concern     Parent/sibling w/ CABG, MI or angioplasty before 65F 55M? Not Asked   Social History Narrative     Not on file       Family History  Family History   Problem Relation Age of Onset     Heart Disease Mother      Diabetes Father      Heart Disease Father      Arthritis Brother         RA       Review of Systems  As per HPI and PMHx, otherwise 7 system review of the head and neck negative.    Physical Exam  /80 (BP Location: Left arm, Patient Position: Chair, Cuff Size: Adult Large)   Pulse 95   Temp 97.6  F (36.4  C) (Oral)   Wt 86.2 kg (190 lb)   BMI 29.10 kg/m    GENERAL: Patient is a pleasant, cooperative 69 year old female in no acute distress.  HEAD: Normocephalic, atraumatic.  Hair and scalp are normal.  EYES: Pupils are equal, round, reactive to light and accommodation.  Extraocular movements are intact.  The sclera nonicteric without injection.  The extraocular structures are normal.  EARS: See below.  NOSE: Nares are patent.  Nasal mucosa is pink and moist.  Negative anterior rhinoscopy.  NEUROLOGIC: Cranial nerves II through XII are grossly intact.  Voice is strong.  Patient is House-Brackman I/VI bilaterally.  CARDIOVASCULAR: Extremities are warm and well-perfused.  No significant  peripheral edema.  RESPIRATORY: Patient has nonlabored breathing without cough, wheeze, stridor.  PSYCHIATRIC: Patient is alert and oriented.  Mood and affect appear normal.  SKIN: Warm and dry.  No scalp, face, or neck lesions noted.    Physical Exam and Procedure    EARS: Normal shape and symmetry.  No tenderness when palpating the mastoid or tragal areas bilaterally.  The ears were then examined under the otic binocular microscope to perform cerumen removal.  Otoscopic exam on the right reveals impacted cerumen down to the level of the tympanic membrane.  The cerumen was removed with a curette and alligator forceps.  The right tympanic membrane was round, intact without evidence of effusion.  No retraction, granulation, drainage, or evidence of cholesteatoma.      Attention was turned to the left ear.  Otoscopic exam on the left reveals impacted cerumen down to the level of the tympanic membrane.  The cerumen was removed with a curette and alligator forceps.  The left tympanic membrane was round, intact without evidence of effusion.  No retraction, granulation, drainage, or evidence of cholesteatoma.      Assessment and Plan     ICD-10-CM    1. Bilateral impacted cerumen H61.23 Remove Cerumen     It was my pleasure seeing Reba Arevalo today in clinic.  The patient had bilateral impacted cerumen today successfully removed in office.  We spent the remainder of today's visit on education including not putting any instrument in the ear.  The patient can use over-the-counter items such as Debrox or Ceruminex.     The patient will follow up in 5 to 6 months for routine cleaning.    Mark Chin MD  Department of Otolarygology-Head and Neck Surgery  [facility]

## 2019-10-11 ENCOUNTER — OFFICE VISIT (OUTPATIENT)
Dept: OTOLARYNGOLOGY | Facility: CLINIC | Age: 69
End: 2019-10-11
Payer: COMMERCIAL

## 2019-10-11 VITALS
BODY MASS INDEX: 29.1 KG/M2 | SYSTOLIC BLOOD PRESSURE: 124 MMHG | TEMPERATURE: 97.6 F | DIASTOLIC BLOOD PRESSURE: 80 MMHG | HEART RATE: 95 BPM | WEIGHT: 190 LBS

## 2019-10-11 DIAGNOSIS — H61.23 BILATERAL IMPACTED CERUMEN: Primary | ICD-10-CM

## 2019-10-11 PROCEDURE — 69210 REMOVE IMPACTED EAR WAX UNI: CPT | Performed by: OTOLARYNGOLOGY

## 2019-10-11 PROCEDURE — 99207 ZZC NO CHARGE LOS: CPT | Performed by: OTOLARYNGOLOGY

## 2019-10-11 ASSESSMENT — PAIN SCALES - GENERAL: PAINLEVEL: NO PAIN (0)

## 2019-10-11 NOTE — LETTER
10/11/2019         RE: Reba Arevalo  [address] [address]        Dear Colleague,    Thank you for referring your patient, Reba Arevalo, to the [facility]. Please see a copy of my visit note below.    Chief Complaint   Patient presents with     Cerumen Impaction     needs ears cleaned     History of Present Illness   Reba Arevalo is a 69 year old female who presents to me today for ear evaluation.  Patient has a history of excessive cerumen.  She was last seen in February 2019 for ear cleaning.  She returns today for repeat examination and ear cleaning.  The patient denies significant hearing changes, otalgia, otorrhea, bloody otorrhea, dizziness, tinnitus, vertigo.  No history of ear surgery or chronic ear disease.     Past Medical History  There is no problem list on file for this patient.    Current Medications    Current Outpatient Medications:      Calcium-Vitamin D (CALCIUM + D PO), Take  by mouth 2 times daily., Disp: , Rfl:      Cholecalciferol (VITAMIN D PO), Take 1 tablet by mouth Taking 2000  IU, Disp: , Rfl:      hydrochlorothiazide (HYDRODIURIL) 25 MG tablet, Take 25 mg by mouth daily., Disp: , Rfl:      latanoprost (XALATAN) 0.005 % ophthalmic solution, 1 drop At Bedtime, Disp: , Rfl:      Multiple Vitamin (DAILY MULTIVITAMIN PO), Take  by mouth daily., Disp: , Rfl:      acetaminophen (TYLENOL) 650 MG CR tablet, Take 1,300 mg by mouth, Disp: , Rfl:      fluocinolone (SYNALAR) 0.01 % solution, , Disp: , Rfl:      hydrocortisone (CORTAID) 1 % external cream, , Disp: , Rfl:     Allergies  Allergies   Allergen Reactions     Penicillins        Social History  Social History     Socioeconomic History     Marital status:      Spouse name: Not on file     Number of children: Not on file     Years of education: Not on file     Highest education level: Not on file   Occupational History     Not on file   Social Needs     Financial resource strain: Not on file     Food  insecurity:     Worry: Not on file     Inability: Not on file     Transportation needs:     Medical: Not on file     Non-medical: Not on file   Tobacco Use     Smoking status: Never Smoker     Smokeless tobacco: Never Used   Substance and Sexual Activity     Alcohol use: No     Drug use: No     Sexual activity: Not on file   Lifestyle     Physical activity:     Days per week: Not on file     Minutes per session: Not on file     Stress: Not on file   Relationships     Social connections:     Talks on phone: Not on file     Gets together: Not on file     Attends Orthodox service: Not on file     Active member of club or organization: Not on file     Attends meetings of clubs or organizations: Not on file     Relationship status: Not on file     Intimate partner violence:     Fear of current or ex partner: Not on file     Emotionally abused: Not on file     Physically abused: Not on file     Forced sexual activity: Not on file   Other Topics Concern     Parent/sibling w/ CABG, MI or angioplasty before 65F 55M? Not Asked   Social History Narrative     Not on file       Family History  Family History   Problem Relation Age of Onset     Heart Disease Mother      Diabetes Father      Heart Disease Father      Arthritis Brother         RA       Review of Systems  As per HPI and PMHx, otherwise 7 system review of the head and neck negative.    Physical Exam  /80 (BP Location: Left arm, Patient Position: Chair, Cuff Size: Adult Large)   Pulse 95   Temp 97.6  F (36.4  C) (Oral)   Wt 86.2 kg (190 lb)   BMI 29.10 kg/m     GENERAL: Patient is a pleasant, cooperative 69 year old female in no acute distress.  HEAD: Normocephalic, atraumatic.  Hair and scalp are normal.  EYES: Pupils are equal, round, reactive to light and accommodation.  Extraocular movements are intact.  The sclera nonicteric without injection.  The extraocular structures are normal.  EARS: See below.  NOSE: Nares are patent.  Nasal mucosa is pink and  moist.  Negative anterior rhinoscopy.  NEUROLOGIC: Cranial nerves II through XII are grossly intact.  Voice is strong.  Patient is House-Brackman I/VI bilaterally.  CARDIOVASCULAR: Extremities are warm and well-perfused.  No significant peripheral edema.  RESPIRATORY: Patient has nonlabored breathing without cough, wheeze, stridor.  PSYCHIATRIC: Patient is alert and oriented.  Mood and affect appear normal.  SKIN: Warm and dry.  No scalp, face, or neck lesions noted.    Physical Exam and Procedure    EARS: Normal shape and symmetry.  No tenderness when palpating the mastoid or tragal areas bilaterally.  The ears were then examined under the otic binocular microscope to perform cerumen removal.  Otoscopic exam on the right reveals impacted cerumen down to the level of the tympanic membrane.  The cerumen was removed with a curette and alligator forceps.  The right tympanic membrane was round, intact without evidence of effusion.  No retraction, granulation, drainage, or evidence of cholesteatoma.      Attention was turned to the left ear.  Otoscopic exam on the left reveals impacted cerumen down to the level of the tympanic membrane.  The cerumen was removed with a curette and alligator forceps.  The left tympanic membrane was round, intact without evidence of effusion.  No retraction, granulation, drainage, or evidence of cholesteatoma.      Assessment and Plan     ICD-10-CM    1. Bilateral impacted cerumen H61.23 Remove Cerumen     It was my pleasure seeing Reba Arevalo today in clinic.  The patient had bilateral impacted cerumen today successfully removed in office.  We spent the remainder of today's visit on education including not putting any instrument in the ear.  The patient can use over-the-counter items such as Debrox or Ceruminex.     The patient will follow up in 5 to 6 months for routine cleaning.    Mark Chin MD  Department of Otolarygology-Head and Neck Surgery  [facility]    Again,  thank you for allowing me to participate in the care of your patient.        Sincerely,        Mark Chin MD

## 2019-10-11 NOTE — NURSING NOTE
"Initial /80 (BP Location: Left arm, Patient Position: Chair, Cuff Size: Adult Large)   Pulse 95   Temp 97.6  F (36.4  C) (Oral)   Wt 86.2 kg (190 lb)   BMI 29.10 kg/m   Estimated body mass index is 29.1 kg/m  as calculated from the following:    Height as of 11/13/17: 1.721 m (5' 7.75\").    Weight as of this encounter: 86.2 kg (190 lb). .    Isela Rodas LPN    "

## 2020-10-20 NOTE — PROGRESS NOTES
Chief Complaint   Patient presents with     Cerumen Impaction     need ears cleaned-left ear plugged     History of Present Illness   Reba Arevalo is a 70 year old female who presents to me today for ear evaluation.  Patient has a history of excessive cerumen.  She was last seen in October 2019 for ear cleaning.  She returns today for repeat examination and ear cleaning.  The patient denies significant hearing changes, otalgia, otorrhea, bloody otorrhea, dizziness, tinnitus, vertigo.  No history of ear surgery or chronic ear disease.     Past Medical History  There is no problem list on file for this patient.    Current Medications    Current Outpatient Medications:      Calcium-Vitamin D (CALCIUM + D PO), Take  by mouth 2 times daily., Disp: , Rfl:      Cholecalciferol (VITAMIN D PO), Take 1 tablet by mouth Taking 2000  IU, Disp: , Rfl:      hydrochlorothiazide (HYDRODIURIL) 25 MG tablet, Take 25 mg by mouth daily., Disp: , Rfl:      latanoprost (XALATAN) 0.005 % ophthalmic solution, 1 drop At Bedtime, Disp: , Rfl:      Multiple Vitamin (DAILY MULTIVITAMIN PO), Take  by mouth daily., Disp: , Rfl:      acetaminophen (TYLENOL) 650 MG CR tablet, Take 1,300 mg by mouth, Disp: , Rfl:      fluocinolone (SYNALAR) 0.01 % solution, , Disp: , Rfl:      hydrocortisone (CORTAID) 1 % external cream, , Disp: , Rfl:     Allergies  Allergies   Allergen Reactions     Penicillins        Social History  Social History     Socioeconomic History     Marital status:      Spouse name: Not on file     Number of children: Not on file     Years of education: Not on file     Highest education level: Not on file   Occupational History     Not on file   Social Needs     Financial resource strain: Not on file     Food insecurity     Worry: Not on file     Inability: Not on file     Transportation needs     Medical: Not on file     Non-medical: Not on file   Tobacco Use     Smoking status: Never Smoker     Smokeless tobacco: Never Used    Substance and Sexual Activity     Alcohol use: No     Drug use: No     Sexual activity: Not on file   Lifestyle     Physical activity     Days per week: Not on file     Minutes per session: Not on file     Stress: Not on file   Relationships     Social connections     Talks on phone: Not on file     Gets together: Not on file     Attends Congregational service: Not on file     Active member of club or organization: Not on file     Attends meetings of clubs or organizations: Not on file     Relationship status: Not on file     Intimate partner violence     Fear of current or ex partner: Not on file     Emotionally abused: Not on file     Physically abused: Not on file     Forced sexual activity: Not on file   Other Topics Concern     Parent/sibling w/ CABG, MI or angioplasty before 65F 55M? Not Asked   Social History Narrative     Not on file       Family History  Family History   Problem Relation Age of Onset     Heart Disease Mother      Diabetes Father      Heart Disease Father      Arthritis Brother         RA       Review of Systems  As per HPI and PMHx, otherwise 7 system review of the head and neck negative.    Physical Exam  /82 (BP Location: Left arm, Patient Position: Sitting, Cuff Size: Adult Large)   Pulse 83   Temp 96.1  F (35.6  C) (Tympanic)   Wt 86.2 kg (190 lb)   BMI 29.10 kg/m    GENERAL: Patient is a pleasant, cooperative 70 year old female in no acute distress.  HEAD: Normocephalic, atraumatic.  Hair and scalp are normal.  EYES: Pupils are equal, round, reactive to light and accommodation.  Extraocular movements are intact.  The sclera nonicteric without injection.  The extraocular structures are normal.  EARS: See below.  NEUROLOGIC: Cranial nerves II through XII are grossly intact.  Voice is strong.  Facial nerve examination incomplete due to the patient wearing a mask.    CARDIOVASCULAR: Extremities are warm and well-perfused.  No significant peripheral edema.  RESPIRATORY: Patient has  nonlabored breathing without cough, wheeze, stridor.  PSYCHIATRIC: Patient is alert and oriented.  Mood and affect appear normal.  SKIN: Warm and dry.  No scalp, face, or neck lesions noted.    Physical Exam and Procedure    EARS: Normal shape and symmetry.  No tenderness when palpating the mastoid or tragal areas bilaterally.  The ears were then examined under the otic binocular microscope to perform cerumen removal.  Otoscopic exam on the right reveals impacted cerumen down to the level of the tympanic membrane.  The cerumen was removed with a curette and alligator forceps.  The right tympanic membrane was round, intact without evidence of effusion.  No retraction, granulation, drainage, or evidence of cholesteatoma.      Attention was turned to the left ear.  Otoscopic exam on the left reveals impacted cerumen down to the level of the tympanic membrane.  The cerumen was removed with a curette and alligator forceps.  The left tympanic membrane was round, intact without evidence of effusion.  No retraction, granulation, drainage, or evidence of cholesteatoma.      Assessment and Plan     ICD-10-CM    1. Bilateral impacted cerumen  H61.23 Remove Cerumen     It was my pleasure seeing Reba Arevalo today in clinic.  The patient had bilateral cerumen impactions today successfully removed in office.  We spent the remainder of today's visit on education including not putting any instrument in the ear.  The patient can use over-the-counter items such as Debrox or Ceruminex.     The patient will follow up 6 months or sooner if symptoms warrant.    Mark Chin MD  Department of Otolarygology-Head and Neck Surgery  [facility]

## 2020-10-21 ENCOUNTER — OFFICE VISIT (OUTPATIENT)
Dept: OTOLARYNGOLOGY | Facility: CLINIC | Age: 70
End: 2020-10-21
Payer: COMMERCIAL

## 2020-10-21 VITALS
WEIGHT: 190 LBS | DIASTOLIC BLOOD PRESSURE: 82 MMHG | TEMPERATURE: 96.1 F | HEART RATE: 83 BPM | SYSTOLIC BLOOD PRESSURE: 135 MMHG | BODY MASS INDEX: 29.1 KG/M2

## 2020-10-21 DIAGNOSIS — H61.23 BILATERAL IMPACTED CERUMEN: Primary | ICD-10-CM

## 2020-10-21 PROCEDURE — 69210 REMOVE IMPACTED EAR WAX UNI: CPT | Mod: 50 | Performed by: OTOLARYNGOLOGY

## 2020-10-21 NOTE — LETTER
10/21/2020         RE: Reba Arevalo  [address] [address]        Dear Colleague,    Thank you for referring your patient, Reba Arevalo, to the [facility]. Please see a copy of my visit note below.    Chief Complaint   Patient presents with     Cerumen Impaction     need ears cleaned-left ear plugged     History of Present Illness   Reba Arevalo is a 70 year old female who presents to me today for ear evaluation.  Patient has a history of excessive cerumen.  She was last seen in October 2019 for ear cleaning.  She returns today for repeat examination and ear cleaning.  The patient denies significant hearing changes, otalgia, otorrhea, bloody otorrhea, dizziness, tinnitus, vertigo.  No history of ear surgery or chronic ear disease.     Past Medical History  There is no problem list on file for this patient.    Current Medications    Current Outpatient Medications:      Calcium-Vitamin D (CALCIUM + D PO), Take  by mouth 2 times daily., Disp: , Rfl:      Cholecalciferol (VITAMIN D PO), Take 1 tablet by mouth Taking 2000  IU, Disp: , Rfl:      hydrochlorothiazide (HYDRODIURIL) 25 MG tablet, Take 25 mg by mouth daily., Disp: , Rfl:      latanoprost (XALATAN) 0.005 % ophthalmic solution, 1 drop At Bedtime, Disp: , Rfl:      Multiple Vitamin (DAILY MULTIVITAMIN PO), Take  by mouth daily., Disp: , Rfl:      acetaminophen (TYLENOL) 650 MG CR tablet, Take 1,300 mg by mouth, Disp: , Rfl:      fluocinolone (SYNALAR) 0.01 % solution, , Disp: , Rfl:      hydrocortisone (CORTAID) 1 % external cream, , Disp: , Rfl:     Allergies  Allergies   Allergen Reactions     Penicillins        Social History  Social History     Socioeconomic History     Marital status:      Spouse name: Not on file     Number of children: Not on file     Years of education: Not on file     Highest education level: Not on file   Occupational History     Not on file   Social Needs     Financial resource strain:  Not on file     Food insecurity     Worry: Not on file     Inability: Not on file     Transportation needs     Medical: Not on file     Non-medical: Not on file   Tobacco Use     Smoking status: Never Smoker     Smokeless tobacco: Never Used   Substance and Sexual Activity     Alcohol use: No     Drug use: No     Sexual activity: Not on file   Lifestyle     Physical activity     Days per week: Not on file     Minutes per session: Not on file     Stress: Not on file   Relationships     Social connections     Talks on phone: Not on file     Gets together: Not on file     Attends Anglican service: Not on file     Active member of club or organization: Not on file     Attends meetings of clubs or organizations: Not on file     Relationship status: Not on file     Intimate partner violence     Fear of current or ex partner: Not on file     Emotionally abused: Not on file     Physically abused: Not on file     Forced sexual activity: Not on file   Other Topics Concern     Parent/sibling w/ CABG, MI or angioplasty before 65F 55M? Not Asked   Social History Narrative     Not on file       Family History  Family History   Problem Relation Age of Onset     Heart Disease Mother      Diabetes Father      Heart Disease Father      Arthritis Brother         RA       Review of Systems  As per HPI and PMHx, otherwise 7 system review of the head and neck negative.    Physical Exam  /82 (BP Location: Left arm, Patient Position: Sitting, Cuff Size: Adult Large)   Pulse 83   Temp 96.1  F (35.6  C) (Tympanic)   Wt 86.2 kg (190 lb)   BMI 29.10 kg/m    GENERAL: Patient is a pleasant, cooperative 70 year old female in no acute distress.  HEAD: Normocephalic, atraumatic.  Hair and scalp are normal.  EYES: Pupils are equal, round, reactive to light and accommodation.  Extraocular movements are intact.  The sclera nonicteric without injection.  The extraocular structures are normal.  EARS: See below.  NEUROLOGIC: Cranial nerves II  through XII are grossly intact.  Voice is strong.  Facial nerve examination incomplete due to the patient wearing a mask.    CARDIOVASCULAR: Extremities are warm and well-perfused.  No significant peripheral edema.  RESPIRATORY: Patient has nonlabored breathing without cough, wheeze, stridor.  PSYCHIATRIC: Patient is alert and oriented.  Mood and affect appear normal.  SKIN: Warm and dry.  No scalp, face, or neck lesions noted.    Physical Exam and Procedure    EARS: Normal shape and symmetry.  No tenderness when palpating the mastoid or tragal areas bilaterally.  The ears were then examined under the otic binocular microscope to perform cerumen removal.  Otoscopic exam on the right reveals impacted cerumen down to the level of the tympanic membrane.  The cerumen was removed with a curette and alligator forceps.  The right tympanic membrane was round, intact without evidence of effusion.  No retraction, granulation, drainage, or evidence of cholesteatoma.      Attention was turned to the left ear.  Otoscopic exam on the left reveals impacted cerumen down to the level of the tympanic membrane.  The cerumen was removed with a curette and alligator forceps.  The left tympanic membrane was round, intact without evidence of effusion.  No retraction, granulation, drainage, or evidence of cholesteatoma.      Assessment and Plan     ICD-10-CM    1. Bilateral impacted cerumen  H61.23 Remove Cerumen     It was my pleasure seeing Reba Arevalo today in clinic.  The patient had bilateral cerumen impactions today successfully removed in office.  We spent the remainder of today's visit on education including not putting any instrument in the ear.  The patient can use over-the-counter items such as Debrox or Ceruminex.     The patient will follow up 6 months or sooner if symptoms warrant.    Mark Chin MD  Department of Otolarygology-Head and Neck Surgery  [facility]         Again, thank you for allowing me to  participate in the care of your patient.        Sincerely,        Mark Chin MD

## 2020-10-21 NOTE — NURSING NOTE
Per physician instructions.    If you have questions or concerns on any instructions given to you by your provider today or if you need to schedule an appointment, you can reach us at [phone].    Thank you!

## 2022-04-15 ENCOUNTER — TELEPHONE (OUTPATIENT)
Dept: PALLIATIVE MEDICINE | Facility: OTHER | Age: 72
End: 2022-04-15

## 2024-06-18 ENCOUNTER — TRANSCRIBE ORDERS (OUTPATIENT)
Dept: OTHER | Age: 74
End: 2024-06-18

## 2024-06-18 DIAGNOSIS — M25.50 ARTHRALGIA, UNSPECIFIED JOINT: Primary | ICD-10-CM
